# Patient Record
Sex: FEMALE | Race: WHITE
[De-identification: names, ages, dates, MRNs, and addresses within clinical notes are randomized per-mention and may not be internally consistent; named-entity substitution may affect disease eponyms.]

---

## 2020-05-18 ENCOUNTER — HOSPITAL ENCOUNTER (OUTPATIENT)
Dept: HOSPITAL 6 - LAB | Age: 25
End: 2020-05-18
Attending: NURSE PRACTITIONER
Payer: COMMERCIAL

## 2020-05-18 DIAGNOSIS — Z34.90: Primary | ICD-10-CM

## 2020-11-16 ENCOUNTER — HOSPITAL ENCOUNTER (OUTPATIENT)
Dept: HOSPITAL 19 - DIA.ED | Age: 25
End: 2020-11-16
Attending: OBSTETRICS & GYNECOLOGY
Payer: COMMERCIAL

## 2020-11-16 DIAGNOSIS — O24.419: Primary | ICD-10-CM

## 2020-11-16 PROCEDURE — G0108 DIAB MANAGE TRN  PER INDIV: HCPCS

## 2020-11-25 ENCOUNTER — HOSPITAL ENCOUNTER (OUTPATIENT)
Dept: HOSPITAL 19 - DIA.ED | Age: 25
End: 2020-11-25
Attending: OBSTETRICS & GYNECOLOGY
Payer: COMMERCIAL

## 2020-11-25 DIAGNOSIS — O24.419: Primary | ICD-10-CM

## 2020-11-25 PROCEDURE — G0108 DIAB MANAGE TRN  PER INDIV: HCPCS

## 2021-01-17 ENCOUNTER — HOSPITAL ENCOUNTER (INPATIENT)
Dept: HOSPITAL 19 - LDRO | Age: 26
LOS: 2 days | Discharge: HOME | End: 2021-01-19
Attending: OBSTETRICS & GYNECOLOGY | Admitting: OBSTETRICS & GYNECOLOGY
Payer: COMMERCIAL

## 2021-01-17 VITALS — HEART RATE: 155 BPM | DIASTOLIC BLOOD PRESSURE: 66 MMHG | SYSTOLIC BLOOD PRESSURE: 110 MMHG

## 2021-01-17 VITALS — TEMPERATURE: 98.2 F | DIASTOLIC BLOOD PRESSURE: 64 MMHG | HEART RATE: 126 BPM | SYSTOLIC BLOOD PRESSURE: 116 MMHG

## 2021-01-17 VITALS — HEART RATE: 111 BPM | DIASTOLIC BLOOD PRESSURE: 73 MMHG | SYSTOLIC BLOOD PRESSURE: 115 MMHG | TEMPERATURE: 98.8 F

## 2021-01-17 VITALS — DIASTOLIC BLOOD PRESSURE: 64 MMHG | SYSTOLIC BLOOD PRESSURE: 113 MMHG | HEART RATE: 104 BPM

## 2021-01-17 VITALS — DIASTOLIC BLOOD PRESSURE: 60 MMHG | HEART RATE: 112 BPM | SYSTOLIC BLOOD PRESSURE: 112 MMHG

## 2021-01-17 VITALS — SYSTOLIC BLOOD PRESSURE: 108 MMHG | HEART RATE: 117 BPM | DIASTOLIC BLOOD PRESSURE: 59 MMHG

## 2021-01-17 VITALS — DIASTOLIC BLOOD PRESSURE: 76 MMHG | SYSTOLIC BLOOD PRESSURE: 112 MMHG | HEART RATE: 110 BPM

## 2021-01-17 VITALS — HEART RATE: 134 BPM

## 2021-01-17 VITALS — DIASTOLIC BLOOD PRESSURE: 66 MMHG | SYSTOLIC BLOOD PRESSURE: 135 MMHG | HEART RATE: 126 BPM

## 2021-01-17 VITALS — DIASTOLIC BLOOD PRESSURE: 63 MMHG | HEART RATE: 116 BPM | SYSTOLIC BLOOD PRESSURE: 112 MMHG

## 2021-01-17 VITALS — DIASTOLIC BLOOD PRESSURE: 67 MMHG | HEART RATE: 110 BPM | SYSTOLIC BLOOD PRESSURE: 107 MMHG

## 2021-01-17 VITALS — HEART RATE: 107 BPM | DIASTOLIC BLOOD PRESSURE: 65 MMHG | SYSTOLIC BLOOD PRESSURE: 103 MMHG

## 2021-01-17 VITALS — HEART RATE: 101 BPM | SYSTOLIC BLOOD PRESSURE: 139 MMHG | DIASTOLIC BLOOD PRESSURE: 84 MMHG | TEMPERATURE: 98.7 F

## 2021-01-17 VITALS — DIASTOLIC BLOOD PRESSURE: 77 MMHG | SYSTOLIC BLOOD PRESSURE: 119 MMHG | HEART RATE: 120 BPM | TEMPERATURE: 97.5 F

## 2021-01-17 VITALS — HEART RATE: 134 BPM | DIASTOLIC BLOOD PRESSURE: 71 MMHG | SYSTOLIC BLOOD PRESSURE: 115 MMHG

## 2021-01-17 VITALS — HEIGHT: 55 IN | WEIGHT: 180.34 LBS

## 2021-01-17 VITALS — HEART RATE: 114 BPM | SYSTOLIC BLOOD PRESSURE: 106 MMHG | DIASTOLIC BLOOD PRESSURE: 69 MMHG

## 2021-01-17 VITALS — HEART RATE: 114 BPM

## 2021-01-17 VITALS — DIASTOLIC BLOOD PRESSURE: 51 MMHG | SYSTOLIC BLOOD PRESSURE: 86 MMHG | HEART RATE: 127 BPM

## 2021-01-17 VITALS — HEART RATE: 125 BPM

## 2021-01-17 VITALS — SYSTOLIC BLOOD PRESSURE: 106 MMHG | HEART RATE: 121 BPM | DIASTOLIC BLOOD PRESSURE: 65 MMHG

## 2021-01-17 DIAGNOSIS — Z3A.40: ICD-10-CM

## 2021-01-17 DIAGNOSIS — O48.0: Primary | ICD-10-CM

## 2021-01-17 LAB
BASOPHILS # BLD: 0 10*3/UL (ref 0–0.2)
BASOPHILS NFR BLD AUTO: 0.2 % (ref 0–2)
EOSINOPHIL # BLD: 0 10*3/UL (ref 0–0.7)
EOSINOPHIL NFR BLD: 0.1 % (ref 0–4)
ERYTHROCYTE [DISTWIDTH] IN BLOOD BY AUTOMATED COUNT: 13 % (ref 11.5–14.5)
GRANULOCYTES # BLD AUTO: 85.3 % (ref 42.2–75.2)
HCT VFR BLD AUTO: 35.6 % (ref 37–47)
HGB BLD-MCNC: 12 G/DL (ref 12.5–16)
LYMPHOCYTES # BLD: 1.6 10*3/UL (ref 1.2–3.4)
LYMPHOCYTES NFR BLD: 8.2 % (ref 20–51)
MCH RBC QN AUTO: 32 PG (ref 27–31)
MCHC RBC AUTO-ENTMCNC: 34 G/DL (ref 33–37)
MCV RBC AUTO: 96 FL (ref 80–100)
MONOCYTES # BLD: 1.1 10*3/UL (ref 0.1–0.6)
MONOCYTES NFR BLD AUTO: 5.6 % (ref 1.7–9.3)
NEUTROPHILS # BLD: 16.2 10*3/UL (ref 1.4–6.5)
PLATELET # BLD AUTO: 209 K/MM3 (ref 130–400)
PMV BLD AUTO: 12.1 FL (ref 7.4–10.4)
RBC # BLD AUTO: 3.7 M/MM3 (ref 4.1–5.3)

## 2021-01-17 NOTE — NUR
PT REQUEST TO HAVE INT REMOVED. THIS RN ASKS IF SHE UNDERSTANDS WHAT RHOGAM IS
FOR AND HOW IT PROTECTS ANY PREGNANCIES SHE MAY HAVE IN THE FUTURE. THE PT
STATES SHE UNDERSTANDS AND DOES NOT WANT TO GET THE RHOGAM. INT IS REMOVED.

## 2021-01-17 NOTE — NUR
Difficulty tracing FHR due to maternal position. RN at bedside adjusting
monitors. Maternal HR tracing. FHR noted in the 130-140s. Pt requesting to
void at this time. Taken off monitors and assisted to the bathroom.

## 2021-01-17 NOTE — NUR
Pt standing at EOB over birthing ball. Difficulty tracing FHR due to maternal
position. RN at bedside adjusting monitors. FHR audible.

## 2021-01-17 NOTE — NUR
Pt arrives on unit via wheelchair with spouse. States painful ctx since 0000.
LOF noticed approximately 24 hours ago at 0400 on 1/16/21 that increased at
2000 on 1/6/21. Denies vaginal bleeding and reports GFM. Changed into clean
gown. EFM and toco applied. VSS. Amniotest positive. SVE 6/100/-2. Dr. Elkins
notied. Reviewed hx and FHR strip and ctx pattern. Orders for admission and
penG prophalaxis for prolonged rupture of membranes.

## 2021-01-17 NOTE — NUR
0730- Pt ambulates to bathroom with standby assist x1. Voids 500mls. Pt starts
feeling dizzy, DESTINY aCrmichael RN in labor room, she grabs wheelchair, Pt passes
out for approx. 15 seconds,  and this RN with Pt. Amonia provided to
Pt. Pt transfered to wheel chair. Returned to labor bed. Pt states she feels a
lot better laying in bed. Pt encouarged to work on breakfast and drink water.
 and call light at bedside.

## 2021-01-17 NOTE — NUR
RN at bedside. Discussed provider recommendation for abx treatment for
prolonged rupture of membranes. Pt refuses abx tx. Denies request for further
information. Blood sugar 108 on admission.

## 2021-01-18 VITALS — DIASTOLIC BLOOD PRESSURE: 70 MMHG | TEMPERATURE: 98.3 F | SYSTOLIC BLOOD PRESSURE: 108 MMHG | HEART RATE: 106 BPM

## 2021-01-18 VITALS — SYSTOLIC BLOOD PRESSURE: 120 MMHG | TEMPERATURE: 98.2 F | DIASTOLIC BLOOD PRESSURE: 70 MMHG | HEART RATE: 105 BPM

## 2021-01-18 VITALS — DIASTOLIC BLOOD PRESSURE: 67 MMHG | SYSTOLIC BLOOD PRESSURE: 103 MMHG | HEART RATE: 102 BPM | TEMPERATURE: 98.2 F

## 2021-01-18 VITALS — DIASTOLIC BLOOD PRESSURE: 69 MMHG | TEMPERATURE: 98 F | HEART RATE: 94 BPM | SYSTOLIC BLOOD PRESSURE: 123 MMHG

## 2021-01-18 VITALS — TEMPERATURE: 98.7 F | HEART RATE: 107 BPM | SYSTOLIC BLOOD PRESSURE: 118 MMHG | DIASTOLIC BLOOD PRESSURE: 66 MMHG

## 2021-01-18 NOTE — NUR
Initial visit; Parents thanked  for offering congratulations and God's
blessings for the birth of their daughter.  thanked family for
choosing [place].

## 2021-01-19 VITALS — DIASTOLIC BLOOD PRESSURE: 70 MMHG | TEMPERATURE: 97.7 F | SYSTOLIC BLOOD PRESSURE: 120 MMHG | HEART RATE: 85 BPM

## 2021-01-19 NOTE — NUR
1010 ALL PERSONAL BELONGINGS GATHERED FROM PATIENT ROOM. PATIENT LEFT
AMBULATORY AND IN NO APPARENT DISTRESS. PATIENT ACCOMPANIED BY SPOUSE AND
NURSING STAFF.

## 2021-01-19 NOTE — NUR
0965 DISCHARGE INSTRUCTIONS REVIEWED WITH PATIENT AND SPOUSE.  PATIENT AND
SPOUSE VERBALIZED UNDERSTANDING. WILL NOTIFY THIS RN WHEN READY TO LEAVE.

## 2021-11-23 ENCOUNTER — HOSPITAL ENCOUNTER (OUTPATIENT)
Dept: HOSPITAL 6 - LAB | Age: 26
End: 2021-11-23
Payer: COMMERCIAL

## 2021-11-23 DIAGNOSIS — Z32.01: Primary | ICD-10-CM

## 2022-06-23 ENCOUNTER — HOSPITAL ENCOUNTER (OUTPATIENT)
Dept: HOSPITAL 19 - COL.RAD | Age: 27
End: 2022-06-23
Payer: COMMERCIAL

## 2022-06-23 DIAGNOSIS — Z3A.38: ICD-10-CM

## 2022-06-23 DIAGNOSIS — Z34.93: Primary | ICD-10-CM
